# Patient Record
Sex: FEMALE | Race: OTHER | HISPANIC OR LATINO | Employment: PART TIME | URBAN - METROPOLITAN AREA
[De-identification: names, ages, dates, MRNs, and addresses within clinical notes are randomized per-mention and may not be internally consistent; named-entity substitution may affect disease eponyms.]

---

## 2022-12-16 ENCOUNTER — HOSPITAL ENCOUNTER (EMERGENCY)
Facility: HOSPITAL | Age: 37
Discharge: HOME | End: 2022-12-16
Attending: EMERGENCY MEDICINE

## 2022-12-16 ENCOUNTER — APPOINTMENT (EMERGENCY)
Dept: RADIOLOGY | Facility: HOSPITAL | Age: 37
End: 2022-12-16

## 2022-12-16 VITALS
WEIGHT: 137.57 LBS | HEIGHT: 60 IN | TEMPERATURE: 98 F | DIASTOLIC BLOOD PRESSURE: 72 MMHG | OXYGEN SATURATION: 100 % | RESPIRATION RATE: 18 BRPM | SYSTOLIC BLOOD PRESSURE: 110 MMHG | HEART RATE: 79 BPM | BODY MASS INDEX: 27.01 KG/M2

## 2022-12-16 DIAGNOSIS — S63.502A SPRAIN OF LEFT WRIST, INITIAL ENCOUNTER: Primary | ICD-10-CM

## 2022-12-16 RX ORDER — ACETAMINOPHEN 325 MG/1
650 TABLET ORAL ONCE
Status: COMPLETED | OUTPATIENT
Start: 2022-12-16 | End: 2022-12-16

## 2022-12-16 RX ADMIN — ACETAMINOPHEN 650 MG: 325 TABLET, FILM COATED ORAL at 20:38

## 2022-12-16 NOTE — Clinical Note
Davis Elizabet was seen and treated in our emergency department on 12/16/2022  Diagnosis:     Radha Elena  may return to work on return date  She may return on this date: 12/19/2022    Pt to wear splint for next 1 week  Follow-up with workman's compensation physician or orthopedist if persistent pain     If you have any questions or concerns, please don't hesitate to call        Aye Lyons PA-C    ______________________________           _______________          _______________  Hospital Representative                              Date                                Time

## 2022-12-17 NOTE — ED NOTES
D/c instructions reviewed, pt verbalized understanding and has no further questions at this time  Pt ambulatory off unit with steady gait       Suzanne Madrid RN  12/16/22 1666

## 2022-12-17 NOTE — ED PROVIDER NOTES
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, packing boxes, dropped a box onto left wrist, which hyperextended in ulnar deviation; has had constant persistent pain since  Took NSAIDs 3 hours prior to arrival   Pt wearing elastic wrist brace          None       History reviewed  No pertinent past medical history  History reviewed  No pertinent surgical history  History reviewed  No pertinent family history  I have reviewed and agree with the history as documented  E-Cigarette/Vaping   • E-Cigarette Use Never User      E-Cigarette/Vaping Substances     Social History     Tobacco Use   • Smoking status: Never     Passive exposure: Never   • Smokeless tobacco: Never   Vaping Use   • Vaping Use: Never used   Substance Use Topics   • Alcohol use: Not Currently   • Drug use: Not Currently       Review of Systems   Constitutional: Positive for fever  Respiratory: Negative for shortness of breath  Cardiovascular: Negative for chest pain  Musculoskeletal: Positive for arthralgias and myalgias  Negative for gait problem and joint swelling  Skin: Negative for rash and wound  Neurological: Negative for numbness  All other systems reviewed and are negative  Physical Exam  Physical Exam  Constitutional:       General: She is in acute distress (mild)  HENT:      Nose: Nose normal       Mouth/Throat:      Mouth: Mucous membranes are moist       Pharynx: Oropharynx is clear  Cardiovascular:      Rate and Rhythm: Normal rate  Pulmonary:      Effort: No respiratory distress  Musculoskeletal:         General: Tenderness and signs of injury present  No swelling or deformity        Comments: Left wrist: + mild diffuse tenderness noted along distal ulnar with increased pain to ulnar deviation, strength intact, distal NV intact, no snuffbox tenderness, full range of motion maintained with some pain   Skin:     Findings: No erythema  Neurological:      General: No focal deficit present  Mental Status: She is alert  Motor: No weakness  Gait: Gait normal          Vital Signs  ED Triage Vitals [12/16/22 1946]   Temperature Pulse Respirations Blood Pressure SpO2   98 °F (36 7 °C) 79 18 110/72 100 %      Temp Source Heart Rate Source Patient Position - Orthostatic VS BP Location FiO2 (%)   Oral Monitor Sitting Left arm --      Pain Score       9           Vitals:    12/16/22 1946   BP: 110/72   Pulse: 79   Patient Position - Orthostatic VS: Sitting         Visual Acuity      ED Medications  Medications - No data to display    Diagnostic Studies  Results Reviewed     None                 XR wrist 3+ views LEFT   ED Interpretation by Mikhail Vu PA-C (12/16 2006)   No fx                 Procedures  Procedures         ED Course                                             MDM  Number of Diagnoses or Management Options  Sprain of left wrist, initial encounter  Diagnosis management comments: Pre-made,static volar splint placed by tech  Amount and/or Complexity of Data Reviewed  Tests in the radiology section of CPT®: ordered and reviewed        Disposition  Final diagnoses:   Sprain of left wrist, initial encounter     Time reflects when diagnosis was documented in both MDM as applicable and the Disposition within this note     Time User Action Codes Description Comment    12/16/2022  7:55 PM Cher Davenport Add [[id number] [id number]] Sprain of left wrist, initial encounter       ED Disposition     ED Disposition   Discharge    Condition   Stable    Date/Time   Fri Dec 16, 2022  8:09 PM    Gautam Bolton Loveless discharge to home/self care                 Follow-up Information     Follow up With Specialties Details Why Contact Info Additional Information    Workman's compensation physician           [facility]   [address] [address] [phone] [address] [address] [phone]          Patient's Medications    No medications on file       No discharge procedures on file      PDMP Review     None          ED Provider  Electronically Signed by           Zion Wesley PA-C  12/16/22 2010

## 2022-12-17 NOTE — DISCHARGE INSTRUCTIONS
Use Splint for next few days for comfort, taking off to bathe or sleep or until follow-up  Use Tylenol 650 mg every 4 hours or Anti-inflammatories like Advil, Motrin, Ibuprofen, Aleve every 6 hours; you can alternate the 2 medications taking something every 3 hours for pain  Follow-up with employee health doctor, your PCP or orthopedic doctor in the next few days if no improvement in condition

## 2022-12-21 ENCOUNTER — OFFICE VISIT (OUTPATIENT)
Dept: OBGYN CLINIC | Facility: CLINIC | Age: 37
End: 2022-12-21

## 2022-12-21 VITALS
HEIGHT: 60 IN | SYSTOLIC BLOOD PRESSURE: 105 MMHG | DIASTOLIC BLOOD PRESSURE: 67 MMHG | OXYGEN SATURATION: 100 % | HEART RATE: 78 BPM | BODY MASS INDEX: 27.48 KG/M2 | WEIGHT: 140 LBS

## 2022-12-21 DIAGNOSIS — M77.8 LEFT WRIST TENDINITIS: Primary | ICD-10-CM

## 2022-12-21 RX ORDER — NAPROXEN 500 MG/1
500 TABLET ORAL 2 TIMES DAILY WITH MEALS
Qty: 28 TABLET | Refills: 0 | Status: SHIPPED | OUTPATIENT
Start: 2022-12-21

## 2022-12-21 NOTE — PROGRESS NOTES
Assessment/Plan   Diagnoses and all orders for this visit:    Left wrist ECU tendinitis  - TFCC injury remains in the differential but is thought to be less likely based on the exam at this time  - Ice, Naprosyn as needed  - Start PT/OT  - Brace for activity  - Follow up with Dr Kiki Samaniego   Patient ID: Nilson Ponce is a 44 y o  female  Vitals:    12/21/22 0822   BP: 105/67   Pulse: 78   SpO2: 100%     38yo female comes in for an evaluation of her left wrist     She was injured on 12-16-22 when she tried to catch a box that was going to fall, and her wrist was pushed into dorsiflexion  Xrays at the ED were normal   All the pain is centered over the ulnar wrist   No popping or clicking  She feels much better in the brace, but has pain again when she takes it off and tries to move it  The following portions of the patient's history were reviewed and updated as appropriate: allergies, current medications, past family history, past medical history, past social history, past surgical history and problem list     Review of Systems  Ortho Exam  History reviewed  No pertinent past medical history  History reviewed  No pertinent surgical history  History reviewed  No pertinent family history  Social History     Occupational History   • Not on file   Tobacco Use   • Smoking status: Never     Passive exposure: Never   • Smokeless tobacco: Never   Vaping Use   • Vaping Use: Never used   Substance and Sexual Activity   • Alcohol use: Not Currently   • Drug use: Not Currently   • Sexual activity: Not on file       Review of Systems   Constitutional: Negative  HENT: Negative  Eyes: Negative  Respiratory: Negative  Cardiovascular: Negative  Gastrointestinal: Negative  Endocrine: Negative  Genitourinary: Negative  Musculoskeletal: As below      Allergic/Immunologic: Negative  Neurological: Negative  Hematological: Negative  Psychiatric/Behavioral: Negative  Objective   Physical Exam    · Constitutional: Awake, Alert, Oriented  · Eyes: EOMI  · Psych: Mood and affect appropriate  · Heart: regular rate   · Lungs: No audible wheezing  · Abdomen: No guarding  · Lymph: no lymphedema  • left wrist:  - Appearance  • Swelling: mild over the distal ulna, no discoloration, no deformity, no ecchymosis and no erythema  - Palpation  o + tenderness ECU tendon and to a lesser extend the ulnar styloid   o Non-tender distal radius, druj, scaphoid, dorsal wrist, volar wrist, and hand  - ROM  o Dorsiflexion 45, palmarflexion 60  Full rad/uln deviation, full pronation and supination  - Motor  o Limited by pain  - Special Tests  o No clicking with hutchins test   + pain with resisted dorsiflexion and with resisted ulnar deviation  o No ECU tendon subluxing at this time    - NVI distally    I have personally reviewed pertinent films in PACS and my interpretation is No acute displaced fracture on xray

## 2022-12-21 NOTE — LETTER
December 21, 2022     Patient: Eduar Hughes  YOB: 1985  Date of Visit: 12/21/2022      To Whom it May Concern:    Eduar Hughes is under my professional care  Mahendra Tsai was seen in my office on 12/21/2022  Mahendrasteph Tsai may return to work with limitations : minimal use of the left hand  If work cannot accommodate this limitation, then remain out until follow up visit  If you have any questions or concerns, please don't hesitate to call           Sincerely,          Theresa Jones PA-C        CC: No Recipients

## 2025-07-14 ENCOUNTER — APPOINTMENT (EMERGENCY)
Dept: ULTRASOUND IMAGING | Facility: HOSPITAL | Age: 40
End: 2025-07-14
Payer: COMMERCIAL

## 2025-07-14 ENCOUNTER — HOSPITAL ENCOUNTER (EMERGENCY)
Facility: HOSPITAL | Age: 40
Discharge: HOME | End: 2025-07-14
Attending: EMERGENCY MEDICINE
Payer: COMMERCIAL

## 2025-07-14 VITALS
RESPIRATION RATE: 18 BRPM | HEART RATE: 80 BPM | SYSTOLIC BLOOD PRESSURE: 116 MMHG | OXYGEN SATURATION: 100 % | TEMPERATURE: 98.5 F | DIASTOLIC BLOOD PRESSURE: 68 MMHG

## 2025-07-14 DIAGNOSIS — O46.90 VAGINAL BLEEDING DURING PREGNANCY: ICD-10-CM

## 2025-07-14 DIAGNOSIS — Z34.91 FIRST TRIMESTER PREGNANCY: ICD-10-CM

## 2025-07-14 DIAGNOSIS — O36.80X0 PREGNANCY OF UNKNOWN ANATOMIC LOCATION: Primary | ICD-10-CM

## 2025-07-14 LAB
ABO GROUP BLD: NORMAL
ALBUMIN SERPL BCG-MCNC: 4.5 G/DL (ref 3.5–5)
ALP SERPL-CCNC: 43 U/L (ref 34–104)
ALT SERPL W P-5'-P-CCNC: 10 U/L (ref 7–52)
ANION GAP SERPL CALCULATED.3IONS-SCNC: 5 MMOL/L (ref 4–13)
AST SERPL W P-5'-P-CCNC: 18 U/L (ref 13–39)
B-HCG SERPL-ACNC: 723.5 MIU/ML (ref 0–5)
BASOPHILS # BLD AUTO: 0.03 THOUSANDS/ÂΜL (ref 0–0.1)
BASOPHILS NFR BLD AUTO: 1 % (ref 0–1)
BILIRUB SERPL-MCNC: 0.8 MG/DL (ref 0.2–1)
BILIRUB UR QL STRIP: NEGATIVE
BLD GP AB SCN SERPL QL: NEGATIVE
BUN SERPL-MCNC: 14 MG/DL (ref 5–25)
CALCIUM SERPL-MCNC: 9.5 MG/DL (ref 8.4–10.2)
CHLORIDE SERPL-SCNC: 105 MMOL/L (ref 96–108)
CLARITY UR: CLEAR
CO2 SERPL-SCNC: 26 MMOL/L (ref 21–32)
COLOR UR: NORMAL
CREAT SERPL-MCNC: 0.84 MG/DL (ref 0.6–1.3)
EOSINOPHIL # BLD AUTO: 0.07 THOUSAND/ÂΜL (ref 0–0.61)
EOSINOPHIL NFR BLD AUTO: 1 % (ref 0–6)
ERYTHROCYTE [DISTWIDTH] IN BLOOD BY AUTOMATED COUNT: 13.2 % (ref 11.6–15.1)
EXT PREGNANCY TEST URINE: POSITIVE
EXT. CONTROL: ABNORMAL
GFR SERPL CREATININE-BSD FRML MDRD: 86 ML/MIN/1.73SQ M
GLUCOSE SERPL-MCNC: 115 MG/DL (ref 65–140)
GLUCOSE UR STRIP-MCNC: NEGATIVE MG/DL
HCT VFR BLD AUTO: 37.7 % (ref 34.8–46.1)
HGB BLD-MCNC: 12.3 G/DL (ref 11.5–15.4)
HGB UR QL STRIP.AUTO: NEGATIVE
IMM GRANULOCYTES # BLD AUTO: 0.02 THOUSAND/UL (ref 0–0.2)
IMM GRANULOCYTES NFR BLD AUTO: 0 % (ref 0–2)
KETONES UR STRIP-MCNC: NEGATIVE MG/DL
LEUKOCYTE ESTERASE UR QL STRIP: NEGATIVE
LIPASE SERPL-CCNC: 42 U/L (ref 11–82)
LYMPHOCYTES # BLD AUTO: 1.91 THOUSANDS/ÂΜL (ref 0.6–4.47)
LYMPHOCYTES NFR BLD AUTO: 31 % (ref 14–44)
MCH RBC QN AUTO: 29.6 PG (ref 26.8–34.3)
MCHC RBC AUTO-ENTMCNC: 32.6 G/DL (ref 31.4–37.4)
MCV RBC AUTO: 91 FL (ref 82–98)
MONOCYTES # BLD AUTO: 0.47 THOUSAND/ÂΜL (ref 0.17–1.22)
MONOCYTES NFR BLD AUTO: 8 % (ref 4–12)
NEUTROPHILS # BLD AUTO: 3.76 THOUSANDS/ÂΜL (ref 1.85–7.62)
NEUTS SEG NFR BLD AUTO: 59 % (ref 43–75)
NITRITE UR QL STRIP: NEGATIVE
NRBC BLD AUTO-RTO: 0 /100 WBCS
PH UR STRIP.AUTO: 5.5 [PH]
PLATELET # BLD AUTO: 233 THOUSANDS/UL (ref 149–390)
PMV BLD AUTO: 9.6 FL (ref 8.9–12.7)
POTASSIUM SERPL-SCNC: 4.3 MMOL/L (ref 3.5–5.3)
PROT SERPL-MCNC: 7.3 G/DL (ref 6.4–8.4)
PROT UR STRIP-MCNC: NEGATIVE MG/DL
RBC # BLD AUTO: 4.16 MILLION/UL (ref 3.81–5.12)
RH BLD: POSITIVE
SODIUM SERPL-SCNC: 136 MMOL/L (ref 135–147)
SP GR UR STRIP.AUTO: 1.01 (ref 1–1.03)
SPECIMEN EXPIRATION DATE: NORMAL
UROBILINOGEN UR STRIP-ACNC: <2 MG/DL
WBC # BLD AUTO: 6.26 THOUSAND/UL (ref 4.31–10.16)

## 2025-07-14 PROCEDURE — 86901 BLOOD TYPING SEROLOGIC RH(D): CPT | Performed by: EMERGENCY MEDICINE

## 2025-07-14 PROCEDURE — 85025 COMPLETE CBC W/AUTO DIFF WBC: CPT

## 2025-07-14 PROCEDURE — 87086 URINE CULTURE/COLONY COUNT: CPT | Performed by: EMERGENCY MEDICINE

## 2025-07-14 PROCEDURE — 84702 CHORIONIC GONADOTROPIN TEST: CPT

## 2025-07-14 PROCEDURE — 76815 OB US LIMITED FETUS(S): CPT

## 2025-07-14 PROCEDURE — 83690 ASSAY OF LIPASE: CPT

## 2025-07-14 PROCEDURE — 80053 COMPREHEN METABOLIC PANEL: CPT

## 2025-07-14 PROCEDURE — 86850 RBC ANTIBODY SCREEN: CPT | Performed by: EMERGENCY MEDICINE

## 2025-07-14 PROCEDURE — 86900 BLOOD TYPING SEROLOGIC ABO: CPT | Performed by: EMERGENCY MEDICINE

## 2025-07-14 PROCEDURE — 99284 EMERGENCY DEPT VISIT MOD MDM: CPT

## 2025-07-14 PROCEDURE — 81003 URINALYSIS AUTO W/O SCOPE: CPT | Performed by: EMERGENCY MEDICINE

## 2025-07-14 PROCEDURE — 36415 COLL VENOUS BLD VENIPUNCTURE: CPT

## 2025-07-14 PROCEDURE — 99284 EMERGENCY DEPT VISIT MOD MDM: CPT | Performed by: EMERGENCY MEDICINE

## 2025-07-14 PROCEDURE — 81025 URINE PREGNANCY TEST: CPT | Performed by: EMERGENCY MEDICINE

## 2025-07-14 NOTE — ED NOTES
"Patient and family stated \"we will be leaving now, we have been waiting for 6 hours\". RN apologized and notified provider. RN educated patient importance in waiting for results. Patient and family are persistent about leaving.      Lisette Betancourt RN  07/14/25 0601    "

## 2025-07-14 NOTE — ED PROVIDER NOTES
ED Disposition       None          Assessment & Plan   {Hyperlinks  Risk Stratification - NIHSS - HEART SCORE - Fill out sepsis note and make sure you call [id number] if severe or septic shock:[id number]}    Medical Decision Making  Amount and/or Complexity of Data Reviewed  Labs: ordered.  Radiology: ordered.        ED Course as of 07/14/25 1448   Mon Jul 14, 2025   1440 PREGNANCY TEST URINE(!): Positive       Medications - No data to display    ED Risk Strat Scores                    No data recorded        SBIRT 20yo+      Flowsheet Row Most Recent Value   Initial Alcohol Screen: US AUDIT-C     2. How many drinks containing alcohol do you have on a typical day you are drinking?  0 Filed at: 07/14/2025 1212   3b. FEMALE Any Age, or MALE 65+: How often do you have 4 or more drinks on one occassion? 0 Filed at: 07/14/2025 1212   Audit-C Score 0 Filed at: 07/14/2025 1212   ATILIO: How many times in the past year have you...    Used an illegal drug or used a prescription medication for non-medical reasons? Never Filed at: 07/14/2025 1212                            History of Present Illness   {Hyperlinks  History (Med, Surg, Fam, Social) - Current Medications - Allergies  :[id number]}    Chief Complaint   Patient presents with   • Vaginal Bleeding - Pregnant     Pt 8 weeks pregnant- started with brown discharge Thursday/Friday. Last night, started with bloody discharge- one pad for the entire night. Saw  this AM and due to clots and cramping that started last night, was advised to come to ER for eval. Nate vaginal bleeding at this time.        Past Medical History[1]   Past Surgical History[2]   Family History[3]   Social History[4]   E-Cigarette/Vaping   • E-Cigarette Use Never User       E-Cigarette/Vaping Substances   • Nicotine No    • THC No    • CBD No    • Flavoring No    • Other No    • Unknown No       I have reviewed and agree with the history as documented.     Patient is a 40-year-old G1 female presenting  to the emergency department evaluation of vaginal bleeding that began yesterday with passage of tissue after a positive home pregnancy test on Friday.  Patient has a picture of the small mass that she passed.  Reports Saturday she had slight brown vaginal discharge and yesterday began with lower abdominal cramping and heavier bleeding.  Now with persistent mild lower abdominal cramping and vaginal bleeding which she reports is similar to her usual period.  Patient is approximately at 8 weeks dated by last menstrual cycle.  Has not seen an OB/GYN yet as it was the weekend and they were unable to schedule an appointment.  Denies any fevers, chills, nausea, vomiting.        Review of Systems        Objective   {Hyperlinks  Historical Vitals - Historical Labs - Chart Review/Microbiology - Last Echo - Code Status  :[id number]}    ED Triage Vitals [07/14/25 1210]   Temperature Pulse Blood Pressure Respirations SpO2 Patient Position - Orthostatic VS   98.5 °F (36.9 °C) 80 116/68 18 100 % Sitting      Temp Source Heart Rate Source BP Location FiO2 (%) Pain Score    Oral Monitor Right arm -- --      Vitals      Date and Time Temp Pulse SpO2 Resp BP Pain Score FACES Pain Rating User   07/14/25 1210 98.5 °F (36.9 °C) 80 100 % 18 116/68 -- -- AK            Physical Exam    Results Reviewed       Procedure Component Value Units Date/Time    POCT pregnancy, urine [[id number]]     Lab Status: No result     UA w Reflex to Microscopic w Reflex to Culture [[id number]]     Lab Status: No result Specimen: Urine     hCG, quantitative [[id number]]     Lab Status: No result Specimen: Blood     Comprehensive metabolic panel [[id number]] Collected: 07/14/25 1214    Lab Status: Final result Specimen: Blood from Arm, Right Updated: 07/14/25 1236     Sodium 136 mmol/L      Potassium 4.3 mmol/L      Chloride 105 mmol/L      CO2 26 mmol/L      ANION GAP 5 mmol/L      BUN 14 mg/dL      Creatinine 0.84 mg/dL      Glucose 115 mg/dL      Calcium 9.5  mg/dL      AST 18 U/L      ALT 10 U/L      Alkaline Phosphatase 43 U/L      Total Protein 7.3 g/dL      Albumin 4.5 g/dL      Total Bilirubin 0.80 mg/dL      eGFR 86 ml/min/1.73sq m     Narrative:      National Kidney Disease Foundation guidelines for Chronic Kidney Disease (CKD):   •  Stage 1 with normal or high GFR (GFR > 90 mL/min/1.73 square meters)  •  Stage 2 Mild CKD (GFR = 60-89 mL/min/1.73 square meters)  •  Stage 3A Moderate CKD (GFR = 45-59 mL/min/1.73 square meters)  •  Stage 3B Moderate CKD (GFR = 30-44 mL/min/1.73 square meters)  •  Stage 4 Severe CKD (GFR = 15-29 mL/min/1.73 square meters)  •  Stage 5 End Stage CKD (GFR <15 mL/min/1.73 square meters)  Note: GFR calculation is accurate only with a steady state creatinine    Lipase [[id number]]  (Normal) Collected: 07/14/25 1214    Lab Status: Final result Specimen: Blood from Arm, Right Updated: 07/14/25 1236     Lipase 42 u/L     CBC and differential [[id number]] Collected: 07/14/25 1214    Lab Status: Final result Specimen: Blood from Arm, Right Updated: 07/14/25 1224     WBC 6.26 Thousand/uL      RBC 4.16 Million/uL      Hemoglobin 12.3 g/dL      Hematocrit 37.7 %      MCV 91 fL      MCH 29.6 pg      MCHC 32.6 g/dL      RDW 13.2 %      MPV 9.6 fL      Platelets 233 Thousands/uL      nRBC 0 /100 WBCs      Segmented % 59 %      Immature Grans % 0 %      Lymphocytes % 31 %      Monocytes % 8 %      Eosinophils Relative 1 %      Basophils Relative 1 %      Absolute Neutrophils 3.76 Thousands/µL      Absolute Immature Grans 0.02 Thousand/uL      Absolute Lymphocytes 1.91 Thousands/µL      Absolute Monocytes 0.47 Thousand/µL      Eosinophils Absolute 0.07 Thousand/µL      Basophils Absolute 0.03 Thousands/µL             US OB < 14 weeks with transvaginal    (Results Pending)       Procedures    ED Medication and Procedure Management   Prior to Admission Medications   Prescriptions Last Dose Informant Patient Reported? Taking?   naproxen (Naprosyn) 500 mg  tablet   No No   Sig: Take 1 tablet (500 mg total) by mouth 2 (two) times a day with meals Prn pain      Facility-Administered Medications: None     Patient's Medications   Discharge Prescriptions    No medications on file     No discharge procedures on file.  ED SEPSIS DOCUMENTATION                [1]  No past medical history on file.  [2]  No past surgical history on file.  [3]  No family history on file.  [4]  Social History  Tobacco Use   • Smoking status: Never     Passive exposure: Never   • Smokeless tobacco: Never   Vaping Use   • Vaping status: Never Used   Substance Use Topics   • Alcohol use: Not Currently   • Drug use: Not Currently    [id number]      UA w Reflex to Microscopic w Reflex to Culture [[id number]] Collected: 07/14/25 1429    Lab Status: Final result Specimen: Urine, Clean Catch Updated: 07/14/25 1437     Color, UA Light Yellow     Clarity, UA Clear     Specific Gravity, UA 1.015     pH, UA 5.5     Leukocytes, UA Negative     Nitrite, UA Negative     Protein, UA Negative mg/dl      Glucose, UA Negative mg/dl      Ketones, UA Negative mg/dl      Urobilinogen, UA <2.0 mg/dl      Bilirubin, UA Negative     Occult Blood, UA Negative     URINE COMMENT --    POCT pregnancy, urine [[id number]]  (Abnormal) Collected: 07/14/25 1424    Lab Status: Final result Updated: 07/14/25 1425     EXT Preg Test, Ur Positive     Control Valid    Comprehensive metabolic panel [[id number]] Collected: 07/14/25 1214    Lab Status: Final result Specimen: Blood from Arm, Right Updated: 07/14/25 1236     Sodium 136 mmol/L      Potassium 4.3 mmol/L      Chloride 105 mmol/L      CO2 26 mmol/L      ANION GAP 5 mmol/L      BUN 14 mg/dL      Creatinine 0.84 mg/dL      Glucose 115 mg/dL      Calcium 9.5 mg/dL      AST 18 U/L      ALT 10 U/L      Alkaline Phosphatase 43 U/L      Total Protein 7.3 g/dL      Albumin 4.5 g/dL      Total Bilirubin 0.80 mg/dL      eGFR 86 ml/min/1.73sq m     Narrative:      National Kidney Disease Foundation guidelines for Chronic Kidney Disease (CKD):     Stage 1 with normal or high GFR (GFR > 90 mL/min/1.73 square meters)    Stage 2 Mild CKD (GFR = 60-89 mL/min/1.73 square meters)    Stage 3A Moderate CKD (GFR = 45-59 mL/min/1.73 square meters)    Stage 3B Moderate CKD (GFR = 30-44 mL/min/1.73 square meters)    Stage 4 Severe CKD (GFR = 15-29 mL/min/1.73 square meters)    Stage 5 End Stage CKD (GFR <15 mL/min/1.73 square meters)  Note: GFR calculation is accurate only with a steady state creatinine    Lipase [[id number]]  (Normal) Collected: 07/14/25 1214    Lab Status: Final result Specimen: Blood from Arm, Right Updated: 07/14/25 1236      Lipase 42 u/L     CBC and differential [[id number]] Collected: 25 1214    Lab Status: Final result Specimen: Blood from Arm, Right Updated: 25 1224     WBC 6.26 Thousand/uL      RBC 4.16 Million/uL      Hemoglobin 12.3 g/dL      Hematocrit 37.7 %      MCV 91 fL      MCH 29.6 pg      MCHC 32.6 g/dL      RDW 13.2 %      MPV 9.6 fL      Platelets 233 Thousands/uL      nRBC 0 /100 WBCs      Segmented % 59 %      Immature Grans % 0 %      Lymphocytes % 31 %      Monocytes % 8 %      Eosinophils Relative 1 %      Basophils Relative 1 %      Absolute Neutrophils 3.76 Thousands/µL      Absolute Immature Grans 0.02 Thousand/uL      Absolute Lymphocytes 1.91 Thousands/µL      Absolute Monocytes 0.47 Thousand/µL      Eosinophils Absolute 0.07 Thousand/µL      Basophils Absolute 0.03 Thousands/µL             US OB pregnancy limited with transvaginal   Final Interpretation by Xiao Bermudez MD ([id number])   No intrauterine gestation or adnexal mass identified.      Differential remains early IUP, spontaneous  and ectopic pregnancy. Correlate with serial quantitative bHCG.            Workstation performed: [id number]             Procedures    ED Medication and Procedure Management   Prior to Admission Medications   Prescriptions Last Dose Informant Patient Reported? Taking?   naproxen (Naprosyn) 500 mg tablet   No No   Sig: Take 1 tablet (500 mg total) by mouth 2 (two) times a day with meals Prn pain      Facility-Administered Medications: None     Discharge Medication List as of 2025  6:55 PM        CONTINUE these medications which have NOT CHANGED    Details   naproxen (Naprosyn) 500 mg tablet Take 1 tablet (500 mg total) by mouth 2 (two) times a day with meals Prn pain, Starting Wed 2022, Normal           Outpatient Discharge Orders   hCG, quantitative   Standing Status: Future Number of Occurrences: 1 Standing Exp. Date: 26     ED SEPSIS DOCUMENTATION   Time reflects when diagnosis was  documented in both MDM as applicable and the Disposition within this note       Time User Action Codes Description Comment    7/14/2025  6:11 PM Emanuel Gil Add [Z34.91] First trimester pregnancy     7/14/2025  6:11 PM Emanuel Gil Add [O36.80X0] Pregnancy of unknown anatomic location     7/14/2025 10:36 PM Marcy Epstein Add [O46.90] Vaginal bleeding during pregnancy     7/14/2025 10:37 PM Marcy Epstein Modify [O36.80X0] Pregnancy of unknown anatomic location                    [1] No past medical history on file.  [2] No past surgical history on file.  [3] No family history on file.  [4]   Social History  Tobacco Use    Smoking status: Never     Passive exposure: Never    Smokeless tobacco: Never   Vaping Use    Vaping status: Never Used   Substance Use Topics    Alcohol use: Not Currently    Drug use: Not Currently        Aleksandra Schuler MD  07/24/25 0717

## 2025-07-15 PROBLEM — O36.80X0 PREGNANCY OF UNKNOWN ANATOMIC LOCATION: Status: ACTIVE | Noted: 2025-07-15

## 2025-07-15 LAB — BACTERIA UR CULT: NORMAL

## 2025-07-15 NOTE — ED ATTENDING ATTESTATION
7/14/2025  I, Marcy Epstein MD, saw and evaluated the patient. I have discussed the patient with the resident/non-physician practitioner and agree with the resident's/non-physician practitioner's findings, Plan of Care, and MDM as documented in the resident's/non-physician practitioner's note, except where noted. All available labs and Radiology studies were reviewed.  I was present for key portions of any procedure(s) performed by the resident/non-physician practitioner and I was immediately available to provide assistance.       At this point I agree with the current assessment done in the Emergency Department.  I have conducted an independent evaluation of this patient a history and physical is as follows:    40-year-old female with first term pregnancy presenting to the ER due to lower abdominal cramping and vaginal bleeding.  No chest pain trouble breathing.  No lightness or dizziness.  No vomiting.  Has not had confirmatory ultrasound.    Agree with checking hCG quant, CBC and electrolytes, pelvic ultrasound, type and screen to evaluate for RhoGAM          ED Course         Critical Care Time  Procedures

## 2025-07-17 ENCOUNTER — APPOINTMENT (OUTPATIENT)
Dept: LAB | Facility: CLINIC | Age: 40
End: 2025-07-17
Payer: COMMERCIAL

## 2025-07-17 DIAGNOSIS — O36.80X0 PREGNANCY OF UNKNOWN ANATOMIC LOCATION: ICD-10-CM

## 2025-07-17 LAB — B-HCG SERPL-ACNC: 117 MIU/ML (ref 0–5)

## 2025-07-17 PROCEDURE — 36415 COLL VENOUS BLD VENIPUNCTURE: CPT

## 2025-07-17 PROCEDURE — 84702 CHORIONIC GONADOTROPIN TEST: CPT
